# Patient Record
Sex: FEMALE | Race: WHITE
[De-identification: names, ages, dates, MRNs, and addresses within clinical notes are randomized per-mention and may not be internally consistent; named-entity substitution may affect disease eponyms.]

---

## 2020-02-13 ENCOUNTER — HOSPITAL ENCOUNTER (OUTPATIENT)
Dept: HOSPITAL 46 - OPS | Age: 75
Discharge: HOME | End: 2020-02-13
Attending: SURGERY
Payer: MEDICARE

## 2020-02-13 VITALS — WEIGHT: 158.01 LBS | BODY MASS INDEX: 26.33 KG/M2 | HEIGHT: 65 IN

## 2020-02-13 DIAGNOSIS — Z79.82: ICD-10-CM

## 2020-02-13 DIAGNOSIS — K31.89: ICD-10-CM

## 2020-02-13 DIAGNOSIS — K29.50: ICD-10-CM

## 2020-02-13 DIAGNOSIS — K29.80: ICD-10-CM

## 2020-02-13 DIAGNOSIS — Z79.899: ICD-10-CM

## 2020-02-13 DIAGNOSIS — K44.9: ICD-10-CM

## 2020-02-13 DIAGNOSIS — K21.0: Primary | ICD-10-CM

## 2020-02-13 PROCEDURE — 0DB78ZX EXCISION OF STOMACH, PYLORUS, VIA NATURAL OR ARTIFICIAL OPENING ENDOSCOPIC, DIAGNOSTIC: ICD-10-PCS | Performed by: SURGERY

## 2020-02-13 PROCEDURE — 0DB98ZX EXCISION OF DUODENUM, VIA NATURAL OR ARTIFICIAL OPENING ENDOSCOPIC, DIAGNOSTIC: ICD-10-PCS | Performed by: SURGERY

## 2020-02-13 PROCEDURE — 0DB38ZX EXCISION OF LOWER ESOPHAGUS, VIA NATURAL OR ARTIFICIAL OPENING ENDOSCOPIC, DIAGNOSTIC: ICD-10-PCS | Performed by: SURGERY

## 2020-02-13 PROCEDURE — G0500 MOD SEDAT ENDO SERVICE >5YRS: HCPCS

## 2020-02-13 PROCEDURE — 0DB48ZX EXCISION OF ESOPHAGOGASTRIC JUNCTION, VIA NATURAL OR ARTIFICIAL OPENING ENDOSCOPIC, DIAGNOSTIC: ICD-10-PCS | Performed by: SURGERY

## 2020-02-13 NOTE — NUR
02/13/20 Nabila7 Paris Deluca
1048- PT ARRIVES TO PACU AROUSABLE TO NOXIOUS STIMULI INSTANTLY FALLS
BACK TO SLEEP. RESP EVEN AND UNLABORED. OXYGEN SAT HIGH 90'S %
ON 4L VIA NC.
1054- OXYGEN TITRATED DOWN TO 2L VIA NC.

## 2020-02-13 NOTE — NUR
TO PATIENT ROOM, STAFF REPORTED WANTING IV OUT. POSITIONED PATIENT SITTING AT
EDGE OF BED. DAUGHTER AT SIDE. PATIENT REPORTS NO N/V, OR DIZZINESS. PATIENT
REQUESTED AGAIN TO PLEASE REMOVE IV CATHETER. REMOVED IV CATH, TIP INTACT.
PATIENT NOW GETTING DRESSED WITH DAUGHTER ASSISTING. REPORTED TO TING SUN AT
NURSES STATION BEFORE LEAVING DEPARTMENT.

## 2020-02-13 NOTE — OR
[facility]
                                    [address], [place]  [address]
_________________________________________________________________________________________
                                                                 Signed   
 
 
DATE OF OPERATION:
02/13/2020
 
SURGEON:
Tree Saunders MD
 
PREOPERATIVE DIAGNOSES:
1. Pereira's esophagus (2015).
2. Hiatal hernia.
3. Esophagitis.
4. Duodenal ulcers.
 
POSTOPERATIVE DIAGNOSES:
1. Small hiatal hernia (38-36 cm).
2. Mild-to-moderate gastroduodenitis.
3. No visible evidence of Pereira's esophagus nor esophagitis.
 
PROCEDURE PERFORMED:
EGD with CLOtest and biopsies of the pyloric bulb, antrum, GE junction, and distal
esophagus. 
 
ESTIMATED BLOOD LOSS:
None.
 
INDICATIONS:
Pauline is a 74-year-old lady, who had a colonoscopy back in 2006.  In the meantime, she
had upper endoscopy in 2015.  There was description of a hiatal hernia with esophagitis
and probable Pereira's esophagus.  She apparently had multiple duodenal ulcers.  She had
been asked to follow up in 3 years for a repeat upper endoscopy.  She also had a
colonoscopy and we have been trying to track down those results as well.  She is known
to have idiopathic pancreatitis as well.  She said she is doing fine currently.  No
upper GI complaints.  She does use aspirin 81 mg p.o. daily.  She also takes Zantac 150
mg p.o. b.i.d.  She said that controls her acid reflux symptoms quite well.  In the
office, I gave Pauline a booklet on upper endoscopy.  We looked at that together along
with the risks including, but not limited to gas bloating, crampy abdominal pain,
bleeding, perforation requiring surgery, and missed diagnosis.  We also reviewed the
need for IV conscious sedation.  She had expressed understanding and wished to proceed. 
 
PROCEDURE NOTE:
Bill was taken into our endoscopy suite and placed in the supine semi-recumbent
position.  The posterior oropharynx was anesthetized with Hurricaine spray.  A bite
block was utilized for the case.  She was given a total of 4 mg of Versed and 100 mcg of
 
    Electronically Signed By: TREE SAUNDERS MD  02/13/20 1158
_________________________________________________________________________________________
PATIENT NAME:     MARKLE,MARY HELEN                     
MEDICAL RECORD #: [id number]            OPERATIVE REPORT              
          ACCT #: [id number]  
DATE OF BIRTH:   [birth year]            REPORT #: [id number]      
PHYSICIAN:        TREE SAUNDERS MD             
PCP:              JAVIER UNDERWOOD MD    
REPORT IS CONFIDENTIAL AND NOT TO BE RELEASED WITHOUT AUTHORIZATION
 
 
                                  [facility]
                                    [address] [place] [address]  [id number]
_________________________________________________________________________________________
                                                                 Signed   
 
 
fentanyl to cover the case.  The adult gastroscope was introduced and advanced out into
the 2nd portion of the duodenum under direct visualization of camera without difficulty.
 The duodenum proper was unremarkable.  However, the pyloric channel did show some mild
patchy erythematous changes.  Consequently, we took a biopsy out of the pyloric bulb for
pathologic review.  The stomach also showed mild diffuse punctate erythema consistent
with gastroduodenitis.  We went ahead and took a biopsy from antrum for CLOtest as well
as pathologic review.  We saw no ulcerations in the pyloric bulb nor the stomach.  Upon
retroflexion of scope, she appears to have just a small hiatal hernia.  The scope was
withdrawn up through the area of GE junction, which was compliant without stricture.
There was no gastric or esophageal varices.  She has mild disruption to the Z-line.  She
has no visible Pereira's mucosa.  We took several circumferential biopsies around the GE
junction.  We also did not see any irritation in the distal, middle or upper esophagus.
We went ahead and took a biopsy of the distal esophagus for pathologic review.  After
this, the gas was suctioned out and the gastroscope removed.  Her vocal cords and
arytenoids were unremarkable.  Pauline tolerated the procedure quite well. 
 
RECOMMENDATIONS:
I will see Pauline back in my office in 7 to 14 days to review her results.  We will also
follow up on her previous colonoscopy results from 2015.  In the meantime, she will stay
on her Zantac. 
 
 
 
            ________________________________________
            Tree Saunders MD 
 
 
ALB/MODL
Job #:  [id number]
DD:  02/13/2020 10:47:52
DT:  02/13/2020 11:17:04
 
cc:            MD Javier Puckett MD
 
 
Copies:  TREE SAUNDERS MD
~
 
 
 
 
    Electronically Signed By: TREE SAUNDERS MD  02/13/20 1158
_________________________________________________________________________________________
PATIENT NAME:     MARKLE,MARY HELEN                     
MEDICAL RECORD #: [id number]            OPERATIVE REPORT              
          ACCT #: [id number]  
DATE OF BIRTH:   [birth year]            REPORT #: [id number]      
PHYSICIAN:        TREE SAUNDERS MD             
PCP:              JAVIER UNDERWOOD MD    
REPORT IS CONFIDENTIAL AND NOT TO BE RELEASED WITHOUT AUTHORIZATION

## 2020-02-14 NOTE — PATH
[facility]
                                    [address], [place]  [address]
_________________________________________________________________________________________
                                                                 Signed   
 
 
 
SPECIMEN(S): A DUODENUM BULB
SPECIMEN(S): B ANTRUM/PYLORUS
SPECIMEN(S): C GE JUNCTION
SPECIMEN(S): D LOWER ESOPHAGUS
 
SPECIMEN SOURCE:
A. DUODENUM BULB
B. ANTRUM/PYLORUS
C. GE JUNCTION
D. LOWER ESOPHAGUS
 
CLINICAL HISTORY:
Pre: Pereira's esophagus, duodenitis, esophagitis.
Post: Gastroduodenitis, small hiatal hernia.
MICROSCOPIC DESCRIPTION:
Histologic sections of all submitted blocks are examined by light microscopy. 
These findings, together with the gross examination, support the pathologic 
diagnosis. 
 
FINAL PATHOLOGIC DIAGNOSIS:
A.  Duodenum, bulb, biopsy:
-  Duodenal mucosa with mild increased lamina propria chronic inflammation and 
mucosal capillary congestion. 
-  Negative for dysplasia or malignancy.
B.  Stomach, antrum/pylorus, biopsy:
-  Antral mucosa with reactive gastropathy.
-  Negative for Helicobacter organisms on HE stain.
-  Negative for dysplasia or malignancy.
C.  Gastroesophageal junction, biopsy:
-  Squamous mucosa with mild chronic inflammation and reactive epithelial 
changes, consistent with reflux esophagitis. 
-  Cardia-oxyntic type mucosa with chronic, inactive gastritis.
-  Negative for Helicobacter organisms on HE stain.
-  Negative for intestinal metaplasia, dysplasia or malignancy.
D.  Esophagus, lower, biopsy:
-  Squamous mucosa with changes consistent with mild reflux esophagitis.
-  Negative for intestinal metaplasia, dysplasia, or malignancy.
NAL:cml:C2NR
 
GROSS DESCRIPTION:
Four specimens are received in four containers, labeled "MM."
 
                                                                                    
_________________________________________________________________________________________
PATIENT NAME:     MARKLE,MARY HELEN                     
MEDICAL RECORD #: [id number]            PATHOLOGY                     
          ACCT #: [id number]       ACCESSION #: [id number]     
DATE OF BIRTH:   [birth year]            REPORT #: [id number]       
PHYSICIAN:        JAILENE ARGUELLO              
PCP:              MACKENZIE UNDERWOOD MD    
REPORT IS CONFIDENTIAL AND NOT TO BE RELEASED WITHOUT AUTHORIZATION
 
 
                                  [facility]
                                    [address], [place]  [address]
_________________________________________________________________________________________
                                                                 Signed   
 
 
A. The specimen, labeled "MM, 1" and "duodenum bulb" on the requisition, is 
received in formalin and consists of a 0.3 cm soft tan tissue fragment that is 
submitted in toto in cassette A1. 
B. The specimen, labeled "MM, 2" and "antrum/pylorus" on the requisition, is 
received in formalin and consists of a 0.3 cm soft tan tissue fragment that is 
submitted in toto in cassette B1. 
C.  The specimen, labeled "MM, 3" and "GE junction" on the requisition, is 
received in formalin and consists of three soft tan tissue fragments that vary 
from 0.2-0.3 cm and are submitted in toto in 
cassette C1.
D.  The specimen, labeled "MM, 4" and "lower esophagus" on the requisition, is 
received in formalin and consists of a 0.4 cm soft tan flat tissue fragment 
that is submitted in toto in cassette D1. 
SS (under the direct supervision of a pathologist)
 
The Gross Description was prepared using a voice recognition system.  The 
report was reviewed for accuracy; however, sound-alike word errors, addition 
and/or deletions may occur.  If there is any 
question about this report, please contact Client Services.
 
PERFORMING LABORATORY:
The technical component was performed by [facility] [employer], [address] [place] [id number] (Medical Director: Sandy Fry MD; CLIA# [id number]). 
[facility], [address] [address] (CLIA# [id number]). 
 
Diagnostician:  Anna Marie Nino MD
Pathologist
Electronically Signed 02/14/2020
 
 
Copies:                                
~
 
 
 
 
 
 
 
 
 
                                                                                    
_________________________________________________________________________________________
PATIENT NAME:     MARKLE,MARY HELEN                     
MEDICAL RECORD #: [id number]            PATHOLOGY                     
          ACCT #: [id number]       ACCESSION #: [id number]     
DATE OF BIRTH:   [birth year]            REPORT #: [id number]       
PHYSICIAN:        JAILENE ARGUELLO              
PCP:              MACKENZIE UNDERWOOD MD    
REPORT IS CONFIDENTIAL AND NOT TO BE RELEASED WITHOUT AUTHORIZATION